# Patient Record
Sex: FEMALE | Race: AMERICAN INDIAN OR ALASKA NATIVE
[De-identification: names, ages, dates, MRNs, and addresses within clinical notes are randomized per-mention and may not be internally consistent; named-entity substitution may affect disease eponyms.]

---

## 2017-07-14 ENCOUNTER — HOSPITAL ENCOUNTER (INPATIENT)
Dept: HOSPITAL 14 - H.ER | Age: 52
LOS: 3 days | Discharge: HOME | DRG: 191 | End: 2017-07-17
Attending: INTERNAL MEDICINE | Admitting: INTERNAL MEDICINE
Payer: MEDICARE

## 2017-07-14 VITALS — BODY MASS INDEX: 33.3 KG/M2

## 2017-07-14 DIAGNOSIS — Z87.891: ICD-10-CM

## 2017-07-14 DIAGNOSIS — Z96.642: ICD-10-CM

## 2017-07-14 DIAGNOSIS — J44.1: Primary | ICD-10-CM

## 2017-07-14 DIAGNOSIS — D86.0: ICD-10-CM

## 2017-07-14 DIAGNOSIS — I27.2: ICD-10-CM

## 2017-07-14 DIAGNOSIS — J45.909: ICD-10-CM

## 2017-07-14 DIAGNOSIS — I25.10: ICD-10-CM

## 2017-07-14 DIAGNOSIS — J06.9: ICD-10-CM

## 2017-07-14 DIAGNOSIS — F41.9: ICD-10-CM

## 2017-07-14 DIAGNOSIS — R09.02: ICD-10-CM

## 2017-07-14 DIAGNOSIS — D86.85: ICD-10-CM

## 2017-07-14 DIAGNOSIS — F32.9: ICD-10-CM

## 2017-07-14 DIAGNOSIS — I47.2: ICD-10-CM

## 2017-07-14 DIAGNOSIS — Z95.810: ICD-10-CM

## 2017-07-14 DIAGNOSIS — I50.9: ICD-10-CM

## 2017-07-14 DIAGNOSIS — I11.0: ICD-10-CM

## 2017-07-14 LAB
ALBUMIN SERPL-MCNC: 4 G/DL (ref 3.5–5)
ALBUMIN/GLOB SERPL: 1.1 {RATIO} (ref 1–2.1)
ALT SERPL-CCNC: 26 U/L (ref 9–52)
APTT BLD: 32.3 SECONDS (ref 25.6–37.1)
AST SERPL-CCNC: 25 U/L (ref 14–36)
BACTERIA #/AREA URNS HPF: (no result) /[HPF]
BASOPHILS # BLD AUTO: 0.1 K/UL (ref 0–0.2)
BASOPHILS NFR BLD: 0.5 % (ref 0–2)
BILIRUB UR-MCNC: NEGATIVE MG/DL
BNP SERPL-MCNC: 748 PG/ML (ref 0–900)
BUN SERPL-MCNC: 12 MG/DL (ref 7–17)
CALCIUM SERPL-MCNC: 9.4 MG/DL (ref 8.4–10.2)
COLOR UR: (no result)
EOSINOPHIL # BLD AUTO: 0.1 K/UL (ref 0–0.7)
EOSINOPHIL NFR BLD: 0.5 % (ref 0–4)
ERYTHROCYTE [DISTWIDTH] IN BLOOD BY AUTOMATED COUNT: 16.5 % (ref 11.5–14.5)
GFR NON-AFRICAN AMERICAN: > 60
GLUCOSE UR STRIP-MCNC: (no result) MG/DL
HGB BLD-MCNC: 13.1 G/DL (ref 12–16)
INR PPP: 1 (ref 0.9–1.2)
LEUKOCYTE ESTERASE UR-ACNC: (no result) LEU/UL
LYMPHOCYTES # BLD AUTO: 4.7 K/UL (ref 1–4.3)
LYMPHOCYTES NFR BLD AUTO: 35.6 % (ref 20–40)
MCH RBC QN AUTO: 29 PG (ref 27–31)
MCHC RBC AUTO-ENTMCNC: 32.3 G/DL (ref 33–37)
MCV RBC AUTO: 89.6 FL (ref 81–99)
MONOCYTES # BLD: 1.3 K/UL (ref 0–0.8)
MONOCYTES NFR BLD: 9.6 % (ref 0–10)
NEUTROPHILS # BLD: 7 K/UL (ref 1.8–7)
NEUTROPHILS NFR BLD AUTO: 53.8 % (ref 50–75)
NRBC BLD AUTO-RTO: 0 % (ref 0–0)
PH UR STRIP: 7 [PH] (ref 5–8)
PLATELET # BLD: 183 K/UL (ref 130–400)
PMV BLD AUTO: 9.1 FL (ref 7.2–11.7)
PROT UR STRIP-MCNC: NEGATIVE MG/DL
PROTHROMBIN TIME: 10.8 SECONDS (ref 9.8–13.1)
RBC # BLD AUTO: 4.53 MIL/UL (ref 3.8–5.2)
RBC # UR STRIP: NEGATIVE /UL
SP GR UR STRIP: 1.01 (ref 1–1.03)
SQUAMOUS EPITHIAL: < 1 /HPF (ref 0–5)
URINE CLARITY: CLEAR
URINE NITRATE: NEGATIVE
UROBILINOGEN UR-MCNC: (no result) MG/DL (ref 0.2–1)
WBC # BLD AUTO: 13.1 K/UL (ref 4.8–10.8)

## 2017-07-14 NOTE — ED PDOC
HPI: SOB/CHF/COPD


Time Seen by Provider: 07/14/17 19:14


Chief Complaint (Nursing): Shortness Of Breath


Chief Complaint (Provider): Shortness Of Breath


History Per: Patient


History/Exam Limitations: no limitations


Onset/Duration Of Symptoms: Days (x 1 week)


Current Symptoms Are (Timing): Still Present


Additional Complaint(s): 





Darinel is a 53 y/o female with a past medical history of asthma, pulmonary 

sarcoid, pulmonary hypertension, and CHF, who presents to the emergency 

department with complaints of shortness of breath associated with chest 

tightness, and a productive cough and yellow phlegm ongoing x 1 week. Patient 

denies fever, chills, nausea, vomiting, and diarrhea. Patient reports seeing 

her PCP/Pulmonary specialist Dr. Hope who started her on Prednisone with no 

improvement in symptoms. Patient sent to ED by Dr. Hope. 





PMD: Dr. Feliciano Hope MD 





Past Medical History


Reviewed: Historical Data, Nursing Documentation, Vital Signs


Vital Signs: 


 Last Vital Signs











Temp  98.3 F   07/15/17 00:45


 


Pulse  90   07/15/17 00:45


 


Resp  18   07/15/17 00:45


 


BP  140/95 H  07/15/17 00:45


 


Pulse Ox  96   07/15/17 00:45














- Medical History


PMH: Anxiety, Asthma, CAD, Cardia Arrhythmia, CHF, COPD, Depression, HTN, 

Pneumonia


   Denies: Arthritis, Atrial Fibrillation, Diabetes, HIV, Hypercholesterolemia, 

Hypothyroidism, Mitral Valve Prolapse, Peripheral Edema, Chronic Kidney Disease

, Rheumatoid Arthritis, Seizures, TIA


Other PMH: Pulmonary hypertension, pulmonary sarcoid





- Surgical History


Surgical History: Back Surgery (lumbar)


   Denies: Pacemaker


Other surgeries: Left hip replacement due to traumatic injury, Automated 

Implantable Cardioverter-Defibrillator (AICD)





- Family History


Family History: States: Unknown Family Hx





- Social History


Current smoker - smoking cessation education provided: No


Alcohol: None


Drugs: Denies





- Immunization History


Hx Tetanus Toxoid Vaccination: No


Hx Influenza Vaccination: No


Hx Pneumococcal Vaccination: No





- Home Medications


Home Medications: 


 Ambulatory Orders











 Medication  Instructions  Recorded


 


Albuterol 0.083% [Albuterol 0.083% 2.5 mg NEB BID 07/14/17





Inhal Sol (2.5 mg/3 ml) UD]  


 


Albuterol Sulfate [Proair Hfa] 2 puff NEB PRN PRN 07/14/17


 


Carvedilol [Coreg] 12.5 mg PO BID 07/14/17


 


Cyclobenzaprine [Flexeril] 10 mg PO DAILY 07/14/17


 


Fluticasone/Salmeterol 100/50 2 puff NEB DAILY 07/14/17





[Advair Diskus 100/50]  


 


Furosemide [Lasix] 40 mg PO DAILY 07/14/17


 


Omeprazole [Omeprazole] 40 mg PO DAILY 07/14/17


 


Potassium Chloride [K-Dur 20 mEq 20 meq PO DAILY 07/14/17





ER Tab]  


 


Ramipril [Altace] 10 mg PO DAILY 07/14/17


 


oxyCODONE [oxyCODONE Immediate 10 mg PO PRN PRN 07/14/17





Release Tab]  














- Allergies


Allergies/Adverse Reactions: 


 Allergies











Allergy/AdvReac Type Severity Reaction Status Date / Time


 


No Known Allergies Allergy   Verified 05/17/16 22:32














Review of Systems


ROS Statement: Except As Marked, All Systems Reviewed And Found Negative


Constitutional: Negative for: Fever, Chills


Cardiovascular: Positive for: Chest Pain (chest tightness)


Respiratory: Positive for: Cough (Productive cough with yellow phlegm production

), Shortness of Breath


Gastrointestinal: Negative for: Nausea, Vomiting, Diarrhea





Physical Exam





- Reviewed


Nursing Documentation Reviewed: Yes


Vital Signs Reviewed: Yes





- Physical Exam


Appears: Positive for: Non-toxic, No Acute Distress


Head Exam: Positive for: ATRAUMATIC, NORMAL INSPECTION, NORMOCEPHALIC


Skin: Positive for: Normal Color, Warm, Dry


Eye Exam: Positive for: EOMI, Normal appearance, PERRL


ENT: Positive for: Normal ENT Inspection


Neck: Positive for: Normal, Painless ROM, Supple


Cardiovascular/Chest: Positive for: Regular Rate, Rhythm


Respiratory: Positive for: Rhonchi (Bilateral diffuse rhonchi), Other (

Decreased air entry)


Gastrointestinal/Abdominal: Positive for: Normal Exam, Soft.  Negative for: 

Tenderness


Back: Positive for: Normal Inspection.  Negative for: Vertebral Tenderness


Extremity: Positive for: Normal ROM.  Negative for: Pedal Edema, Calf Tenderness

, Other (JVD)


Neurologic/Psych: Positive for: Alert, Oriented.  Negative for: Motor/Sensory 

Deficits





- Laboratory Results


Result Diagrams: 


 07/14/17 20:29





 07/14/17 20:08





- ECG


O2 Sat by Pulse Oximetry: 98 (RA)


Pulse Ox Interpretation: Normal





- Other Rad


  ** Chest X-Ray


X-Ray: Interpreted by Me, Viewed By Me


X-Ray Interpretation: Cardiomegaly and bilateral congestion with AICD present 





Medical Decision Making


Medical Decision Making: 





Time: 19:30


Initial Impression: 53 y/o female with productive cough, dyspnea, and a past 

medical history of pulmonary hypertension, pulmonary sarcoid, CHF and asthma 

with failure of outpatient treatment


Initial Plan:


--Labs 


--CXR


--EKG


--Trial of Lasix (40 mg IV), methylprednisolone (125 mg IV), and Albuterol/

Ipratropium (3 mL INH)


--Peak Flow pre/post treatment 


--Reevaluation 





Time: 20:30


--Admit to observation for asthma and CHF exacerbation 





Time: 20:35


--Consulted with Dr. Hope who agreed to place patient under his service for 

asthma exacerbation and CHF








--------------------------------------------------------------------------------

-----------------


Scribe Attestation:


Documented by Fe Madrigal, acting as a scribe for Maximilian Valenzuela MD





Provider Scribe Attestation:


All medical record entries made by the Scribe were at my direction and 

personally dictated by me. I have reviewed the chart and agree that the record 

accurately reflects my personal performance of the history, physical exam, 

medical decision making, and the department course for this patient. I have 

also personally directed, reviewed, and agree with the discharge instructions 

and disposition.





Disposition





- Clinical Impression


Clinical Impression: 


 Chronic congestive heart failure, Sarcoidosis, Asthma








- Patient ED Disposition


Is Patient to be Admitted: Yes


Counseled Patient/Family Regarding: Studies Performed, Diagnosis





- Disposition


Disposition Time: 20:00


Condition: STABLE





- Pt Status Changed To:


Hospital Disposition Of: Observation

## 2017-07-15 LAB
ARTERIAL BLOOD GAS HEMOGLOBIN: 13.6 G/DL (ref 11.7–17.4)
ARTERIAL BLOOD GAS O2 SAT: 98 % (ref 95–98)
ARTERIAL BLOOD GAS PCO2: 51 MM/HG (ref 35–45)
ARTERIAL BLOOD GAS TCO2: 33.2 MMOL/L (ref 22–28)
ARTERIAL PATENCY WRIST A: YES
BNP SERPL-MCNC: 1020 PG/ML (ref 0–900)
BUN SERPL-MCNC: 14 MG/DL (ref 7–17)
CALCIUM SERPL-MCNC: 9.9 MG/DL (ref 8.4–10.2)
ERYTHROCYTE [DISTWIDTH] IN BLOOD BY AUTOMATED COUNT: 16.7 % (ref 11.5–14.5)
GFR NON-AFRICAN AMERICAN: > 60
HCO3 BLDA-SCNC: 29.1 MMOL/L (ref 21–28)
HGB BLD-MCNC: 13.2 G/DL (ref 12–16)
INHALED O2 CONCENTRATION: 28 %
MCH RBC QN AUTO: 29.2 PG (ref 27–31)
MCHC RBC AUTO-ENTMCNC: 32.9 G/DL (ref 33–37)
MCV RBC AUTO: 88.6 FL (ref 81–99)
O2 CAP BLDA-SCNC: 18.5 ML/DL (ref 16–24)
O2 CT BLDA-SCNC: 18.1 ML/DL (ref 15–23)
PH BLDA: 7.4 [PH] (ref 7.35–7.45)
PLATELET # BLD: 173 K/UL (ref 130–400)
PO2 BLDA: 78 MM/HG (ref 80–100)
RBC # BLD AUTO: 4.52 MIL/UL (ref 3.8–5.2)
WBC # BLD AUTO: 11.2 K/UL (ref 4.8–10.8)

## 2017-07-15 RX ADMIN — OXYCODONE HYDROCHLORIDE AND ACETAMINOPHEN PRN TAB: 5; 325 TABLET ORAL at 12:37

## 2017-07-15 RX ADMIN — IPRATROPIUM BROMIDE AND ALBUTEROL SULFATE SCH ML: .5; 3 SOLUTION RESPIRATORY (INHALATION) at 02:44

## 2017-07-15 RX ADMIN — IPRATROPIUM BROMIDE AND ALBUTEROL SULFATE SCH ML: .5; 3 SOLUTION RESPIRATORY (INHALATION) at 19:11

## 2017-07-15 RX ADMIN — FLUTICASONE PROPIONATE AND SALMETEROL SCH PUFF: 50; 100 POWDER RESPIRATORY (INHALATION) at 16:30

## 2017-07-15 RX ADMIN — OXYCODONE HYDROCHLORIDE AND ACETAMINOPHEN PRN TAB: 5; 325 TABLET ORAL at 21:55

## 2017-07-15 RX ADMIN — OXYCODONE HYDROCHLORIDE AND ACETAMINOPHEN PRN TAB: 5; 325 TABLET ORAL at 04:47

## 2017-07-15 RX ADMIN — METHYLPREDNISOLONE SODIUM SUCCINATE SCH MLS/HR: 40 INJECTION, POWDER, FOR SOLUTION INTRAMUSCULAR; INTRAVENOUS at 16:29

## 2017-07-15 RX ADMIN — PANTOPRAZOLE SODIUM SCH MG: 40 TABLET, DELAYED RELEASE ORAL at 08:37

## 2017-07-15 RX ADMIN — IPRATROPIUM BROMIDE AND ALBUTEROL SULFATE SCH ML: .5; 3 SOLUTION RESPIRATORY (INHALATION) at 13:03

## 2017-07-15 RX ADMIN — METHYLPREDNISOLONE SODIUM SUCCINATE SCH MLS/HR: 40 INJECTION, POWDER, FOR SOLUTION INTRAMUSCULAR; INTRAVENOUS at 08:39

## 2017-07-15 RX ADMIN — ENOXAPARIN SODIUM SCH MG: 40 INJECTION SUBCUTANEOUS at 08:37

## 2017-07-15 RX ADMIN — IPRATROPIUM BROMIDE AND ALBUTEROL SULFATE SCH ML: .5; 3 SOLUTION RESPIRATORY (INHALATION) at 07:12

## 2017-07-15 RX ADMIN — POTASSIUM CHLORIDE SCH MEQ: 20 TABLET, EXTENDED RELEASE ORAL at 08:37

## 2017-07-15 RX ADMIN — FLUTICASONE PROPIONATE AND SALMETEROL SCH PUFF: 50; 100 POWDER RESPIRATORY (INHALATION) at 08:35

## 2017-07-15 NOTE — RAD
HISTORY:

cough  



COMPARISON:

Chest x-ray performed 6/28/16 



TECHNIQUE:

Chest, one view.



FINDINGS:

Examination limited by habitus.



LUNGS:

Linear atelectasis, left upper lobe. No focal consolidation.



Please note that chest x-ray has limited sensitivity for the 

detection of pulmonary masses.



PLEURA:

No significant pleural effusion identified. No definite pneumothorax .



CARDIOVASCULAR:

Single lead left-sided AICD. Heart size appears top normal.



OSSEOUS STRUCTURES:

 Degenerative changes.



VISUALIZED UPPER ABDOMEN:

Unremarkable.



OTHER FINDINGS:

None.



IMPRESSION:

Left-sided AICD. 



Linear atelectasis, left upper lobe.

## 2017-07-15 NOTE — CARD
--------------- APPROVED REPORT --------------





EKG Measurement

Heart [id number]

MS [id number]

[id number]

[id number]

[id number]



<Conclusion>

Normal sinus rhythm

Left atrial enlargement

Nonspecific ST and T wave abnormality

Abnormal ECG

## 2017-07-15 NOTE — CARD
--------------- APPROVED REPORT --------------





EKG Measurement

Heart [id number]

NH [id number]

[id number]

[id number]

[id number]



<Conclusion>

Sinus rhythm with occasional premature ventricular complexes

Biatrial enlargement

Left ventricular hypertrophy

T wave abnormality, consider lateral ischemia

Abnormal ECG

## 2017-07-15 NOTE — CP.PCM.CON
History of Present Illness





- History of Present Illness


History of Present Illness: 





CC:


Cough.





HPI:


I have been requested on cardiology consultation by Dr. Hope for this patient 

who is known to our practice. She has a history of sarcoidosis and 

cardiomyopathy for which an AICD was implanted. She is is admitted for dyspnea 

and a productive cough with greenish sputum as well as palpitations. She denies 

chest pain, fever, chills or any other cardiac complaint. Cardiology is 

consulted for 8 beats of NSVT. Otherwise the patient has no complaints.





Review of Systems





- Constitutional


Constitutional: As Per HPI





- EENT


Eyes: As Per HPI


Nose/Mouth/Throat: As Per HPI





- Cardiovascular


Cardiovascular: Palpitations





- Respiratory


Respiratory: Cough, Excessive Mucous Production





- Genitourinary


Genitourinary: As Per HPI





- Integumentary


Integumentary: As Per HPI





- Neurological


Neurological: As Per HPI





- Psychiatric


Psychiatric: As Per HPI





- Endocrine


Endocrine: As Per HPI





- Hematologic/Lymphatic


Hematologic: As Per HPI





Past Patient History





- Infectious Disease


Hx of Infectious Diseases: None





- Past Medical History & Family History


Past Medical History?: Yes





- Past Social History


Alcohol: None


Drugs: Denies





- CARDIAC


Hx Atrial Fibrillation: No


Hx Cardia Arrhythmia: Yes


Hx Congestive Heart Failure: Yes


Hx Hypercholesterolemia: No


Hx Hypertension: Yes


Hx Mitral Valve Prolapse: No


Hx Pacemaker: No


Hx Peripheral Edema: No





- PULMONARY


Hx Asthma: Yes


Hx Chronic Obstructive Pulmonary Disease (COPD): Yes


Hx Pneumonia: Yes





- NEUROLOGICAL


Hx Seizures: No


Hx Transient Ischemic Attacks (TIA): No





- HEENT


Hx HEENT Problems: No





- RENAL


Hx Chronic Kidney Disease: No





- ENDOCRINE/METABOLIC


Hx Hypothyroidism: No





- HEMATOLOGICAL/ONCOLOGICAL


Hx Human Immunodeficiency Virus (HIV): No





- INTEGUMENTARY


Hx Dermatological Problems: No





- MUSCULOSKELETAL/RHEUMATOLOGICAL


Hx Arthritis: No


Hx Rheumatoid Arthritis: No





- GASTROINTESTINAL


Hx Gastrointestinal Disorders: No





- GENITOURINARY/GYNECOLOGICAL


Hx Genitourinary Disorders: No





- PSYCHIATRIC


Hx Anxiety: Yes


Hx Depression: Yes





- SURGICAL HISTORY


Hx Surgeries: Yes


Hx Joint Replacement: Yes (LEFT HIP)


Other/Comment: BACK SURGERY, AICD placement





- ANESTHESIA


Hx Anesthesia: Yes


Hx Anesthesia Reactions: No


Hx Malignant Hyperthermia: No





Meds


Allergies/Adverse Reactions: 


 Allergies











Allergy/AdvReac Type Severity Reaction Status Date / Time


 


No Known Allergies Allergy   Verified 05/17/16 22:32














- Medications


Medications: 


 Current Medications





Albuterol/Ipratropium (Duoneb 3 Mg/0.5 Mg (3 Ml) Ud)  3 ml INH RQ6 [facility]


   Last Admin: 07/15/17 19:11 Dose:  3 ml


Aspirin (Aspirin Chewable)  81 mg PO DAILY [facility]


   Last Admin: 07/15/17 08:37 Dose:  81 mg


Carvedilol (Coreg)  12.5 mg PO Q12 [facility]


   Last Admin: 07/15/17 21:55 Dose:  12.5 mg


Clotrimazole (Lotrimin 1% Cream)  1 applic TOP BID [facility]


   Last Admin: 07/15/17 16:29 Dose:  1 applic


Cyclobenzaprine HCl (Flexeril)  10 mg PO DAILY [facility]


   Last Admin: 07/15/17 08:36 Dose:  10 mg


Enoxaparin Sodium (Lovenox)  40 mg SC DAILY [facility]


   PRN Reason: Protocol


   Last Admin: 07/15/17 08:37 Dose:  40 mg


Furosemide (Lasix)  40 mg IV DAILY [facility]


   Last Admin: 07/15/17 08:38 Dose:  40 mg


Guaifenesin (Robitussin)  200 mg PO Q4 PRN


   PRN Reason: Cough


Azithromycin 500 mg/ Sodium (Chloride)  250 mls @ 250 mls/hr IVPB DAILY [facility]


   Last Admin: 07/15/17 08:39 Dose:  250 mls/hr


Methylprednisolone 40 mg/ (Sodium Chloride)  50 mls @ 100 mls/hr IV Q8 [facility]


   Last Admin: 07/15/17 16:29 Dose:  100 mls/hr


Ondansetron HCl (Zofran Inj)  4 mg IVP Q4 PRN


   PRN Reason: Nausea/Vomiting


   Last Admin: 07/15/17 17:44 Dose:  4 mg


Oxycodone/Acetaminophen (Percocet 5/325 Mg Tab)  1 tab PO Q4 PRN


   PRN Reason: Pain, moderate (4-7)


   Stop: 07/18/17 01:19


   Last Admin: 07/15/17 21:55 Dose:  1 tab


Pantoprazole Sodium (Protonix Ec Tab)  40 mg PO DAILY [facility]


   Last Admin: 07/15/17 08:37 Dose:  40 mg


Potassium Chloride (K-Dur 20 Meq Er Tab)  20 meq PO DAILY [facility]


   Last Admin: 07/15/17 08:37 Dose:  20 meq


Ramipril (Altace)  10 mg PO DAILY [facility]


   Last Admin: 07/15/17 08:36 Dose:  10 mg


Fluticasone/Salmeterol (Advair Diskus 100/50)  1 puff INH BID [facility]


   Last Admin: 07/15/17 16:30 Dose:  1 puff











Physical Exam





- Constitutional


Appears: Well, Non-toxic, No Acute Distress





- Head Exam


Head Exam: NORMAL INSPECTION, NORMOCEPHALIC





- Eye Exam


Eye Exam: Normal appearance, PERRL





- ENT Exam


ENT Exam: Mucous Membranes Moist





- Neck Exam


Neck exam: Positive for: Normal Inspection





- Respiratory Exam


Respiratory Exam: Clear to Auscultation Bilateral, NORMAL BREATHING PATTERN





- Cardiovascular Exam


Cardiovascular Exam: REGULAR RHYTHM, +S1, +S2, Systolic Murmur





- GI/Abdominal Exam


GI & Abdominal Exam: Soft





- Rectal Exam


Rectal Exam: Deferred





- Extremities Exam


Extremities exam: Positive for: full ROM, normal inspection





- Back Exam


Back exam: NORMAL INSPECTION





- Neurological Exam


Neurological exam: Alert, Oriented x3





- Psychiatric Exam


Psychiatric exam: Normal Affect, Normal Mood





- Skin


Skin Exam: Dry, Normal Color, Warm





Results





- Vital Signs


Recent Vital Signs: 


 Last Vital Signs











Temp  97.7 F   07/15/17 19:44


 


Pulse  68   07/15/17 21:55


 


Resp  18   07/15/17 19:44


 


BP  122/75   07/15/17 21:55


 


Pulse Ox  97   07/15/17 19:44














- Labs


Result Diagrams: 


 07/15/17 09:00





 07/15/17 09:00





Assessment & Plan





- Assessment and Plan (Free Text)


Assessment: 





Cough


Dyspnea


Sarcoid


CMP


NSVT


Plan: 





Continue present medical management.


Beta blockers.


Will follow up PRN.


Thank you.





- Date & Time


Date: 07/15/17


Time: 12:05

## 2017-07-16 RX ADMIN — METHYLPREDNISOLONE SODIUM SUCCINATE SCH MLS/HR: 40 INJECTION, POWDER, FOR SOLUTION INTRAMUSCULAR; INTRAVENOUS at 20:23

## 2017-07-16 RX ADMIN — OXYCODONE HYDROCHLORIDE AND ACETAMINOPHEN PRN TAB: 5; 325 TABLET ORAL at 23:08

## 2017-07-16 RX ADMIN — FLUTICASONE PROPIONATE AND SALMETEROL SCH PUFF: 50; 100 POWDER RESPIRATORY (INHALATION) at 16:32

## 2017-07-16 RX ADMIN — FLUTICASONE PROPIONATE AND SALMETEROL SCH PUFF: 50; 100 POWDER RESPIRATORY (INHALATION) at 08:36

## 2017-07-16 RX ADMIN — ENOXAPARIN SODIUM SCH MG: 40 INJECTION SUBCUTANEOUS at 08:43

## 2017-07-16 RX ADMIN — POTASSIUM CHLORIDE SCH MEQ: 20 TABLET, EXTENDED RELEASE ORAL at 08:36

## 2017-07-16 RX ADMIN — METHYLPREDNISOLONE SODIUM SUCCINATE SCH MLS/HR: 40 INJECTION, POWDER, FOR SOLUTION INTRAMUSCULAR; INTRAVENOUS at 08:40

## 2017-07-16 RX ADMIN — METHYLPREDNISOLONE SODIUM SUCCINATE SCH MLS/HR: 40 INJECTION, POWDER, FOR SOLUTION INTRAMUSCULAR; INTRAVENOUS at 00:38

## 2017-07-16 RX ADMIN — OXYCODONE HYDROCHLORIDE AND ACETAMINOPHEN PRN TAB: 5; 325 TABLET ORAL at 17:37

## 2017-07-16 RX ADMIN — OXYCODONE HYDROCHLORIDE AND ACETAMINOPHEN PRN TAB: 5; 325 TABLET ORAL at 08:40

## 2017-07-16 RX ADMIN — IPRATROPIUM BROMIDE AND ALBUTEROL SULFATE SCH ML: .5; 3 SOLUTION RESPIRATORY (INHALATION) at 07:49

## 2017-07-16 RX ADMIN — IPRATROPIUM BROMIDE AND ALBUTEROL SULFATE SCH ML: .5; 3 SOLUTION RESPIRATORY (INHALATION) at 19:25

## 2017-07-16 RX ADMIN — PANTOPRAZOLE SODIUM SCH MG: 40 TABLET, DELAYED RELEASE ORAL at 08:36

## 2017-07-16 RX ADMIN — IPRATROPIUM BROMIDE AND ALBUTEROL SULFATE SCH ML: .5; 3 SOLUTION RESPIRATORY (INHALATION) at 01:42

## 2017-07-16 RX ADMIN — IPRATROPIUM BROMIDE AND ALBUTEROL SULFATE SCH ML: .5; 3 SOLUTION RESPIRATORY (INHALATION) at 13:34

## 2017-07-16 NOTE — CP.PCM.PN
Subjective





- Date & Time of Evaluation


Date of Evaluation: 07/16/17


Time of Evaluation: 09:36





- Subjective


Subjective: 





SOB IMPROVING


COUGH LESS


DENIES CHEST PAINS





Objective





- Vital Signs/Intake and Output


Vital Signs (last 24 hours): 


 











Temp Pulse Resp BP Pulse Ox


 


 97.6 F   72   18   121/70   98 


 


 07/16/17 08:00  07/16/17 08:39  07/16/17 08:00  07/16/17 08:39  07/16/17 08:00











- Medications


Medications: 


 Current Medications





Albuterol/Ipratropium (Duoneb 3 Mg/0.5 Mg (3 Ml) Ud)  3 ml INH RQ6 [facility]


   Last Admin: 07/16/17 07:49 Dose:  3 ml


Aspirin (Aspirin Chewable)  81 mg PO DAILY [facility]


   Last Admin: 07/16/17 08:36 Dose:  81 mg


Carvedilol (Coreg)  12.5 mg PO Q12 [facility]


   Last Admin: 07/16/17 08:39 Dose:  12.5 mg


Clotrimazole (Lotrimin 1% Cream)  1 applic TOP BID [facility]


   Last Admin: 07/15/17 16:29 Dose:  1 applic


Cyclobenzaprine HCl (Flexeril)  10 mg PO DAILY [facility]


   Last Admin: 07/16/17 08:36 Dose:  10 mg


Enoxaparin Sodium (Lovenox)  40 mg SC DAILY [facility]


   PRN Reason: Protocol


   Last Admin: 07/16/17 08:43 Dose:  40 mg


Furosemide (Lasix)  40 mg IV DAILY [facility]


   Last Admin: 07/16/17 08:39 Dose:  40 mg


Guaifenesin (Robitussin)  200 mg PO Q4 PRN


   PRN Reason: Cough


Azithromycin 500 mg/ Sodium (Chloride)  250 mls @ 250 mls/hr IVPB DAILY [facility]


   Last Admin: 07/16/17 08:39 Dose:  250 mls/hr


Methylprednisolone 40 mg/ (Sodium Chloride)  50 mls @ 100 mls/hr IV Q8 [facility]


   Last Admin: 07/16/17 08:40 Dose:  100 mls/hr


Ondansetron HCl (Zofran Inj)  4 mg IVP Q4 PRN


   PRN Reason: Nausea/Vomiting


   Last Admin: 07/15/17 17:44 Dose:  4 mg


Oxycodone/Acetaminophen (Percocet 5/325 Mg Tab)  1 tab PO Q4 PRN


   PRN Reason: Pain, moderate (4-7)


   Stop: 07/18/17 01:19


   Last Admin: 07/16/17 08:40 Dose:  1 tab


Pantoprazole Sodium (Protonix Ec Tab)  40 mg PO DAILY [facility]


   Last Admin: 07/16/17 08:36 Dose:  40 mg


Potassium Chloride (K-Dur 20 Meq Er Tab)  20 meq PO DAILY [facility]


   Last Admin: 07/16/17 08:36 Dose:  20 meq


Ramipril (Altace)  10 mg PO DAILY [facility]


   Last Admin: 07/16/17 08:37 Dose:  10 mg


Fluticasone/Salmeterol (Advair Diskus 100/50)  1 puff INH BID [facility]


   Last Admin: 07/16/17 08:36 Dose:  1 puff











- Labs


Labs: 


 











PT  10.8 Seconds (9.8-13.1)   07/14/17  20:29    


 


INR  1.0  (0.9-1.2)   07/14/17  20:29    


 


APTT  32.3 Seconds (25.6-37.1)   07/14/17  20:29    














- Constitutional


Appears: No Acute Distress





- Head Exam


Head Exam: ATRAUMATIC, NORMAL INSPECTION, NORMOCEPHALIC





- Eye Exam


Eye Exam: EOMI, Normal appearance, PERRL


Pupil Exam: NORMAL ACCOMODATION, PERRL





- ENT Exam


ENT Exam: Mucous Membranes Moist, Normal Exam





- Neck Exam


Neck Exam: Full ROM, Normal Inspection.  absent: Lymphadenopathy





- Respiratory Exam


Respiratory Exam: Decreased Breath Sounds, Rales, Wheezes, NORMAL BREATHING 

PATTERN





- Cardiovascular Exam


Cardiovascular Exam: REGULAR RHYTHM, +S1, +S2.  absent: Murmur





- GI/Abdominal Exam


GI & Abdominal Exam: Soft, Normal Bowel Sounds.  absent: Tenderness





- Rectal Exam


Rectal Exam: NORMAL INSPECTION





- Extremities Exam


Extremities Exam: Full ROM, Normal Capillary Refill, Normal Inspection.  absent

: Joint Swelling, Pedal Edema





- Back Exam


Back Exam: NORMAL INSPECTION





- Neurological Exam


Neurological Exam: Alert, Awake, CN II-XII Intact, Normal Gait, Oriented x3





- Psychiatric Exam


Psychiatric exam: Normal Affect, Normal Mood





- Skin


Skin Exam: Dry, Intact, Normal Color, Warm





Assessment and Plan





- Assessment and Plan (Free Text)


Assessment: 





ACUTE EXAC OF COPD--IMPROVING


SARCOID


ARRYTHMIAS-STABLE


CARDIOMYOPATHY


Plan: 





CONTINUE PRESENT RX


SCHEDULE FOR ECHO

## 2017-07-16 NOTE — HP
HISTORY OF PRESENT ILLNESS:  Ms. Franz is a 52-year-old female who

was admitted at emergency room because of shortness of breath, exercise

intolerance cough, mild fever, exercise intolerance for the past several

days prior to presentation.  She was in the office about a week prior to

this admission, placed on antibiotics, steroids, and *------*, but symptoms

have worsened.  She showed up in the emergency room, had hypoxemia, we will

therefore advise admission for workup and therapy.



PAST MEDICAL HISTORY:  She has a past medical history of pulmonary

sarcoidosis; asthma, which is probably chronic obstructive pulmonary

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

status post pacemaker placement for arrhythmias; hypertension; poor

compliance with followup and therapy.



FAMILY HISTORY:  Unrevealing.



SOCIAL HISTORY:  She quit smoking years ago.  Does not drink alcohol and

leaves alone.



REVIEW OF SYSTEMS:  Essentially remarkable for occasional shortness of

breath and back pain for which she had undergone surgery.



PHYSICAL EXAMINATION

GENERAL:  The patient is alert, oriented; appears mildly dyspneic at rest

and had mild exertion.

VITAL SIGNS:  Blood pressure 134/74, pulse of 67, respiratory rate 18 to 20

per minute.  She is afebrile, O2 stat 99% on nasal cannula oxygen.

SKIN:  Shows fair turgor with rash of thighs and legs.

HEENT:  Pupils equal, reactive to light and accommodation.  She has fair

hygiene.

NECK:  JVP flat.

LUNGS:  Poor aeration with wheezing and rales.

HEART:  Regular with *------* pacemaker in place at the left anterior chest

wall.

ABDOMEN:  Soft, nontender, organomegaly.

EXTREMITIES:  Shows no edema or cyanosis.

CENTRAL NERVOUS SYSTEM:  Grossly intact.



LABORATORY DATA:  Remarkable of WBC of 13.1, hemoglobin 13.1, platelet

count of 183,000.  Sodium 139, potassium 3.5, BUN 12, creatinine 0.8. 

ProBNP 748, repeat 1020.  ABGs *------* 28% FiO2, PH 7.4, pCO2 of 61, pO2

of 78, bicarbonates 29 with O2 sat 94%.



Chest x-ray, linear atelectasis of left upper lobe, no consolidation.  EKG;

sinus rhythm with occasional PVCs, biatrial enlargement, left ventricular

hypertrophy, T-wave abnormality, consider lateral ischemia, telemetry

monitoring was remarkable for short run of ventricular tachycardia.



IMPRESSION:  Acute exacerbation of chronic obstructive pulmonary disease,

cardiomyopathy with cardiac arrhythmias, pulmonary sarcoidosis, upper

respiratory tract infection.



PLAN:  Pan cultures, IV antibiotics, IV steroids, aerosolized

bronchodilators.  Pancreatic evaluation, further therapy will depend on

findings.



__________________________________________

Feliciano Hope MD





DD:  07/15/2017 11:12:05

DT:  07/15/2017 14:37:40

Job # [id number]

## 2017-07-17 VITALS
TEMPERATURE: 97.9 F | DIASTOLIC BLOOD PRESSURE: 76 MMHG | SYSTOLIC BLOOD PRESSURE: 124 MMHG | HEART RATE: 69 BPM | RESPIRATION RATE: 16 BRPM

## 2017-07-17 VITALS — OXYGEN SATURATION: 99 %

## 2017-07-17 RX ADMIN — IPRATROPIUM BROMIDE AND ALBUTEROL SULFATE SCH ML: .5; 3 SOLUTION RESPIRATORY (INHALATION) at 07:51

## 2017-07-17 RX ADMIN — METHYLPREDNISOLONE SODIUM SUCCINATE SCH MLS/HR: 40 INJECTION, POWDER, FOR SOLUTION INTRAMUSCULAR; INTRAVENOUS at 09:24

## 2017-07-17 RX ADMIN — POTASSIUM CHLORIDE SCH MEQ: 20 TABLET, EXTENDED RELEASE ORAL at 09:23

## 2017-07-17 RX ADMIN — PANTOPRAZOLE SODIUM SCH MG: 40 TABLET, DELAYED RELEASE ORAL at 09:24

## 2017-07-17 RX ADMIN — ENOXAPARIN SODIUM SCH MG: 40 INJECTION SUBCUTANEOUS at 09:21

## 2017-07-17 RX ADMIN — IPRATROPIUM BROMIDE AND ALBUTEROL SULFATE SCH: .5; 3 SOLUTION RESPIRATORY (INHALATION) at 01:01

## 2017-07-17 RX ADMIN — FLUTICASONE PROPIONATE AND SALMETEROL SCH PUFF: 50; 100 POWDER RESPIRATORY (INHALATION) at 09:21

## 2017-07-17 NOTE — CP.PCM.DIS
Provider





- Provider


Date of Admission: 


07/15/17 11:02





Attending physician: 


Feliciano Cano MD





Time Spent in preparation of Discharge (in minutes): 30





Diagnosis





- Discharge Diagnosis


(1) Arrhythmia


Status: Acute   





(2) Upper respiratory infection


Status: Acute   





(3) Sarcoidosis


Status: Acute   





(4) COPD exacerbation


Status: Acute   





(5) Cardiomyopathy


Status: Acute   





(6) Hypertension


Status: Acute   





Hospital Course





- Lab Results


Lab Results: 


 Most Recent Lab Values











WBC  11.2 K/uL (4.8-10.8)  H  07/15/17  09:00    


 


RBC  4.52 Mil/uL (3.80-5.20)   07/15/17  09:00    


 


Hgb  13.2 g/dL (12.0-16.0)   07/15/17  09:00    


 


Hct  40.1 % (34.0-47.0)   07/15/17  09:00    


 


MCV  88.6 fl (81.0-99.0)   07/15/17  09:00    


 


MCH  29.2 pg (27.0-31.0)   07/15/17  09:00    


 


MCHC  32.9 g/dL (33.0-37.0)  L  07/15/17  09:00    


 


RDW  16.7 % (11.5-14.5)  H  07/15/17  09:00    


 


Plt Count  173 K/uL (130-400)   07/15/17  09:00    


 


MPV  9.1 fl (7.2-11.7)   07/14/17  20:29    


 


Neut % (Auto)  53.8 % (50.0-75.0)   07/14/17  20:29    


 


Lymph % (Auto)  35.6 % (20.0-40.0)   07/14/17  20:29    


 


Mono % (Auto)  9.6 % (0.0-10.0)   07/14/17  20:29    


 


Eos % (Auto)  0.5 % (0.0-4.0)   07/14/17  20:29    


 


Baso % (Auto)  0.5 % (0.0-2.0)   07/14/17  20:29    


 


Neut #  7.0 K/uL (1.8-7.0)   07/14/17  20:29    


 


Lymph #  4.7 K/uL (1.0-4.3)  H  07/14/17  20:29    


 


Mono #  1.3 K/uL (0.0-0.8)  H  07/14/17  20:29    


 


Eos #  0.1 K/uL (0.0-0.7)   07/14/17  20:29    


 


Baso #  0.1 K/uL (0.0-0.2)   07/14/17  20:29    


 


PT  10.8 Seconds (9.8-13.1)   07/14/17  20:29    


 


INR  1.0  (0.9-1.2)   07/14/17  20:29    


 


APTT  32.3 Seconds (25.6-37.1)   07/14/17  20:29    


 


pCO2  51 mm/Hg (35-45)  H  07/15/17  07:37    


 


pO2  78 mm/Hg ()  L  07/15/17  07:37    


 


HCO3  29.1 mmol/L (21-28)  H  07/15/17  07:37    


 


ABG pH  7.40  (7.35-7.45)   07/15/17  07:37    


 


ABG Total CO2  33.2 mmol/L (22-28)  H  07/15/17  07:37    


 


ABG O2 Saturation  98.0 % (95-98)   07/15/17  07:37    


 


ABG O2 Content  18.1 ML/dL (15-23)   07/15/17  07:37    


 


ABG Base Excess  5.5 mmol/L (-2.0-3.0)  H  07/15/17  07:37    


 


ABG Hemoglobin  13.6 g/dL (11.7-17.4)   07/15/17  07:37    


 


ABG Carboxyhemoglobin  1.9 % (0.5-1.5)  H  07/15/17  07:37    


 


POC ABG HHb (Measured)  1.9 % (0.0-5.0)   07/15/17  07:37    


 


ABG Methemoglobin  1.9 % (0.0-3.0)   07/15/17  07:37    


 


ABG O2 Capacity  18.5 mL/dL (16-24)   07/15/17  07:37    


 


Alfredo Test  Yes   07/15/17  07:37    


 


A-a O2 Difference  58.0 mm/Hg  07/15/17  07:37    


 


Hgb O2 Saturation  94.4 % (95.0-98.0)  L  07/15/17  07:37    


 


FiO2  28.0 %  07/15/17  07:37    


 


Sodium  141 mmol/l (132-148)   07/15/17  09:00    


 


Potassium  4.2 MMOL/L (3.6-5.0)   07/15/17  09:00    


 


Chloride  101 mmol/L ()   07/15/17  09:00    


 


Carbon Dioxide  29 mmol/L (22-30)   07/15/17  09:00    


 


Anion Gap  14  (10-20)   07/15/17  09:00    


 


BUN  14 mg/dl (7-17)   07/15/17  09:00    


 


Creatinine  0.9 mg/dL (0.7-1.2)   07/15/17  09:00    


 


Est GFR ( Amer)  > 60   07/15/17  09:00    


 


Est GFR (Non-Af Amer)  > 60   07/15/17  09:00    


 


Random Glucose  121 mg/dL ()  H  07/15/17  09:00    


 


Calcium  9.9 mg/dL (8.4-10.2)   07/15/17  09:00    


 


Total Bilirubin  0.5 mg/dl (0.2-1.3)   07/14/17  20:08    


 


AST  25 U/L (14-36)   07/14/17  20:08    


 


ALT  26 U/L (9-52)   07/14/17  20:08    


 


Alkaline Phosphatase  138 U/L ()  H D 07/14/17  20:08    


 


Troponin I  0.0150 ng/mL (0.00-0.120)   07/14/17  20:08    


 


NT-Pro-B Natriuret Pep  1020 pg/ml (0-900)  H  07/15/17  09:00    


 


Total Protein  7.7 G/DL (6.3-8.2)   07/14/17  20:08    


 


Albumin  4.0 g/dL (3.5-5.0)   07/14/17  20:08    


 


Globulin  3.6 gm/dL (2.2-3.9)   07/14/17  20:08    


 


Albumin/Globulin Ratio  1.1  (1.0-2.1)   07/14/17  20:08    


 


Urine Color  Straw  (YELLOW)   07/14/17  20:59    


 


Urine Clarity  Clear  (Clear)   07/14/17  20:59    


 


Urine pH  7.0  (5.0-8.0)   07/14/17  20:59    


 


Ur Specific Gravity  1.008  (1.003-1.030)   07/14/17  20:59    


 


Urine Protein  Negative mg/dL (NEGATIVE)   07/14/17  20:59    


 


Urine Glucose (UA)  Neg mg/dL (Normal)   07/14/17  20:59    


 


Urine Ketones  Negative mg/dL (NEGATIVE)   07/14/17  20:59    


 


Urine Blood  Negative  (NEGATIVE)   07/14/17  20:59    


 


Urine Nitrate  Negative  (NEGATIVE)   07/14/17  20:59    


 


Urine Bilirubin  Negative  (NEGATIVE)   07/14/17  20:59    


 


Urine Urobilinogen  0.2-1.0 mg/dL (0.2-1.0)   07/14/17  20:59    


 


Ur Leukocyte Esterase  Trace Cat/uL (Negative)   07/14/17  20:59    


 


Urine RBC (Auto)  1 /hpf (0-3)   07/14/17  20:59    


 


Urine Microscopic WBC  1 /hpf (0-5)   07/14/17  20:59    


 


Ur Squamous Epith Cells  < 1 /hpf (0-5)   07/14/17  20:59    


 


Urine Bacteria  Rare  (<OCC)   07/14/17  20:59    














- Hospital Course


Hospital Course: 





COUGH WITH SOB RESOLVED





Discharge Exam





- Head Exam


Head Exam: ATRAUMATIC, NORMAL INSPECTION, NORMOCEPHALIC





- Eye Exam


Eye Exam: EOMI, Normal appearance, PERRL


Pupil Exam: NORMAL ACCOMODATION, PERRL





- GI/Abdominal Exam


GI & Abdominal Exam: Normal Bowel Sounds





- Rectal Exam


Rectal Exam: NORMAL INSPECTION





- Neurological Exam


Neurological exam: Alert, CN II-XII Intact, Normal Gait, Oriented x3, Reflexes 

Normal





- Psychiatric Exam


Psychiatric exam: Normal Affect, Normal Mood





- Skin


Skin Exam: Dry, Intact, Normal Color, Warm





Discharge Plan





- Follow Up Plan


Condition: STABLE


Disposition: HOME/ ROUTINE


Patient education suggested?: Yes


Additional Instructions: 


D/C HOME FOLLOW UP WITH DR CANO

## 2017-07-17 NOTE — CARD
--------------- APPROVED REPORT --------------





EXAM: Two-dimensional and M-mode echocardiogram with Doppler and 

color Doppler.



Other Information 

Quality : GoodRhythm : Pacemaker



INDICATION

Cardiomyopathy 



Surgery/Intervention

ICD/Pacemaker: 



2D DIMENSIONS 

IVSd1.07   (0.7-1.1cm)LVDd5.16   (3.9-5.9cm)

LVOT Diameter2.15   (1.8-2.4cm)PWd0.87   (0.7-1.1cm)

IVSs1.08   (0.8-1.2cm)LVDs3.93   (2.5-4.0cm)

FS (%) 23.9   %PWs1.15   (0.8-1.2cm)

LVEF (%)50.0   (>50%)



M-Mode DIMENSIONS 

Left Atrium (MM)4.36   (2.5-4.0cm)IVSd1.02   (0.7-1.1cm)

Aortic Root2.59   (2.2-3.7cm)LVDd4.88   (4.0-5.6cm)

Aortic Cusp Exc.1.60   (1.5-2.0cm)PWd1.10   (0.7-1.1cm)

IVSs1.38   cmFS (%) 37   %

LVDs3.09   (2.0-3.8cm)PWs1.54   cm



Mitral Valve

MV E Wavfgezr08.1cm/sMV DECEL [id number] A Zltrohnr40.4cm/s

MV MAX56chZ/A ratio0.8MVA (PHT)2.56cm2



TDI

E/Lateral E'0.0E/Medial E'0.0



Pulmonary Valve

PV Peak Sdgbynpk079.5cm/s



 LEFT VENTRICLE 

The left ventricle is normal size.

There is borderline concentric left ventricular hypertrophy.

The left ventricular function is Borderline

Regional wall motion abnormalities noted.

Transmitral Doppler flow pattern is Grade I-abnormal relaxation 

pattern.



 RIGHT VENTRICLE 

The right ventricle is normal size.

There is normal right ventricular wall thickness.

The right ventricular systolic function is normal.

There is a pacemaker lead in the right ventricle.



 ATRIA 

The left atrium is mildly dilated.

The right atrium size is normal.



 AORTIC VALVE 

The aortic valve is not well visualized.

No aortic regurgitation is present.

There is no aortic valvular stenosis.



 MITRAL VALVE 

The mitral valve is mildly thickened.

There is no mitral valve stenosis.

Mitral regurgitation is mild.

The mitral regurgitant jet is eccentrically directed.



 TRICUSPID VALVE 

The tricuspid valve is normal in structure

There is trace tricuspid regurgitation.



 PULMONIC VALVE 

The pulmonary valve is normal in structure and function.

There is no pulmonic valvular regurgitation.



 GREAT VESSELS 

The aortic root is normal in size.

The IVC was not visualized.



 PERICARDIAL EFFUSION 

There is a trace loculated anterior pericardial effusion.



<Conclusion>

The left ventricle is normal size.

There is borderline concentric left ventricular hypertrophy.

The left ventricular function is normal.

The left ventricular ejection fraction is borderline

Regional wall motion abnormalities noted.

Transmitral Doppler flow pattern is Grade I-abnormal relaxation 

pattern.

Mitral regurgitation is mild.

## 2018-06-02 ENCOUNTER — HOSPITAL ENCOUNTER (EMERGENCY)
Dept: HOSPITAL 14 - H.ER | Age: 53
Discharge: HOME | End: 2018-06-02
Payer: MEDICARE

## 2018-06-02 VITALS
RESPIRATION RATE: 16 BRPM | TEMPERATURE: 98.2 F | SYSTOLIC BLOOD PRESSURE: 147 MMHG | HEART RATE: 104 BPM | DIASTOLIC BLOOD PRESSURE: 93 MMHG

## 2018-06-02 VITALS — BODY MASS INDEX: 35 KG/M2

## 2018-06-02 VITALS — OXYGEN SATURATION: 97 %

## 2018-06-02 DIAGNOSIS — Z86.59: ICD-10-CM

## 2018-06-02 DIAGNOSIS — J44.0: Primary | ICD-10-CM

## 2018-06-02 DIAGNOSIS — Z87.891: ICD-10-CM

## 2018-06-02 LAB
ALBUMIN SERPL-MCNC: 4.1 G/DL (ref 3.5–5)
ALBUMIN/GLOB SERPL: 1.1 {RATIO} (ref 1–2.1)
ALT SERPL-CCNC: 26 U/L (ref 9–52)
APTT BLD: 32.6 SECONDS (ref 25.6–37.1)
AST SERPL-CCNC: 30 U/L (ref 14–36)
BASOPHILS # BLD AUTO: 0.1 K/UL (ref 0–0.2)
BASOPHILS NFR BLD: 0.6 % (ref 0–2)
BNP SERPL-MCNC: 348 PG/ML (ref 0–900)
BUN SERPL-MCNC: 14 MG/DL (ref 7–17)
CALCIUM SERPL-MCNC: 9.3 MG/DL (ref 8.4–10.2)
EOSINOPHIL # BLD AUTO: 0.2 K/UL (ref 0–0.7)
EOSINOPHIL NFR BLD: 1.7 % (ref 0–4)
ERYTHROCYTE [DISTWIDTH] IN BLOOD BY AUTOMATED COUNT: 14.9 % (ref 11.5–14.5)
GFR NON-AFRICAN AMERICAN: > 60
HGB BLD-MCNC: 13.2 G/DL (ref 12–16)
INR PPP: 1.2 (ref 0.9–1.2)
LYMPHOCYTES # BLD AUTO: 2.2 K/UL (ref 1–4.3)
LYMPHOCYTES NFR BLD AUTO: 20.7 % (ref 20–40)
MCH RBC QN AUTO: 30 PG (ref 27–31)
MCHC RBC AUTO-ENTMCNC: 33 G/DL (ref 33–37)
MCV RBC AUTO: 91 FL (ref 81–99)
MONOCYTES # BLD: 1.2 K/UL (ref 0–0.8)
MONOCYTES NFR BLD: 11.1 % (ref 0–10)
NEUTROPHILS # BLD: 6.9 K/UL (ref 1.8–7)
NEUTROPHILS NFR BLD AUTO: 65.9 % (ref 50–75)
NRBC BLD AUTO-RTO: 0 % (ref 0–0)
PLATELET # BLD: 189 K/UL (ref 130–400)
PMV BLD AUTO: 9.2 FL (ref 7.2–11.7)
PROTHROMBIN TIME: 13.3 SECONDS (ref 9.8–13.1)
RBC # BLD AUTO: 4.4 MIL/UL (ref 3.8–5.2)
WBC # BLD AUTO: 10.5 K/UL (ref 4.8–10.8)

## 2018-06-02 PROCEDURE — 87040 BLOOD CULTURE FOR BACTERIA: CPT

## 2018-06-02 PROCEDURE — 85730 THROMBOPLASTIN TIME PARTIAL: CPT

## 2018-06-02 PROCEDURE — 85025 COMPLETE CBC W/AUTO DIFF WBC: CPT

## 2018-06-02 PROCEDURE — 87804 INFLUENZA ASSAY W/OPTIC: CPT

## 2018-06-02 PROCEDURE — 83880 ASSAY OF NATRIURETIC PEPTIDE: CPT

## 2018-06-02 PROCEDURE — 96374 THER/PROPH/DIAG INJ IV PUSH: CPT

## 2018-06-02 PROCEDURE — 80053 COMPREHEN METABOLIC PANEL: CPT

## 2018-06-02 PROCEDURE — 71046 X-RAY EXAM CHEST 2 VIEWS: CPT

## 2018-06-02 PROCEDURE — 84100 ASSAY OF PHOSPHORUS: CPT

## 2018-06-02 PROCEDURE — 85610 PROTHROMBIN TIME: CPT

## 2018-06-02 PROCEDURE — 99285 EMERGENCY DEPT VISIT HI MDM: CPT

## 2018-06-02 PROCEDURE — 83735 ASSAY OF MAGNESIUM: CPT

## 2018-06-02 PROCEDURE — 84484 ASSAY OF TROPONIN QUANT: CPT

## 2018-06-02 NOTE — ED PDOC
HPI: SOB/CHF/COPD


Time Seen by Provider: 06/02/18 19:05


Chief Complaint (Nursing): Shortness Of Breath


Chief Complaint (Provider): shortness of breath 


History Per: Patient


History/Exam Limitations: no limitations


Onset/Duration Of Symptoms: Other (2 weeks)


Current Respiratory Medications: Albuterol


Associated Symptoms: denies: Fever, Productive Cough


Additional Complaint(s): 


52 year old female presents to the ED complaining of shortness of breath and 

chest tightness onset for 2 days. Reports of cough with green sputum and sore 

throat. States she had not relief with Albuterol. Denies leg swelling, fever, 

nasal drainage, recent sick contacts or travel. Patient is well known to the 

ED. 





PMD: Feliciano Hope








Past Medical History


Reviewed: Historical Data, Nursing Documentation, Vital Signs


Vital Signs: 


 Last Vital Signs











Temp  98.2 F   06/02/18 21:27


 


Pulse  104 H  06/02/18 21:27


 


Resp  16   06/02/18 21:27


 


BP  147/93 H  06/02/18 21:27


 


Pulse Ox  97   06/02/18 21:44














- Medical History


PMH: Anxiety, Asthma, CAD, Cardia Arrhythmia, CHF, COPD, Depression, HTN, 

Pneumonia


   Denies: Arthritis, Atrial Fibrillation, Diabetes, HIV, Hypercholesterolemia, 

Hypothyroidism, Mitral Valve Prolapse, Peripheral Edema, Chronic Kidney Disease

, Rheumatoid Arthritis, Seizures, TIA





- Surgical History


Surgical History: Back Surgery (lumbar)


   Denies: Pacemaker





- Family History


Family History: States: Unknown Family Hx





- Social History


Current smoker - smoking cessation education provided: No (Former Smoker)


Alcohol: None


Drugs: Denies





- Immunization History


Hx Tetanus Toxoid Vaccination: No


Hx Influenza Vaccination: No


Hx Pneumococcal Vaccination: No





- Home Medications


Home Medications: 


 Ambulatory Orders











 Medication  Instructions  Recorded


 


Albuterol 0.083% [Albuterol 0.083% 2.5 mg NEB BID 07/14/17





Inhal Sol (2.5 mg/3 ml) UD]  


 


Albuterol Sulfate [Proair Hfa] 2 puff NEB PRN PRN 07/14/17


 


Carvedilol [Coreg] 12.5 mg PO BID 07/14/17


 


Cyclobenzaprine [Flexeril] 10 mg PO DAILY 07/14/17


 


Fluticasone/Salmeterol 100/50 2 puff NEB DAILY 07/14/17





[Advair Diskus 100/50]  


 


Furosemide [Lasix] 40 mg PO DAILY 07/14/17


 


Omeprazole 40 mg PO DAILY 07/14/17


 


Potassium Chloride [K-Dur 20 mEq 20 meq PO DAILY 07/14/17





ER Tab]  


 


Ramipril [Altace] 10 mg PO DAILY 07/14/17


 


oxyCODONE [oxyCODONE Immediate 10 mg PO PRN PRN 07/14/17





Release Tab]  


 


Albuterol 0.083% [Albuterol 3 ml IH Q4 PRN #50 neb 06/02/18





Sulfate 3 Ml]  


 


Azithromycin [Zithromax] 250 mg PO DAILY #6 dose 06/02/18


 


Prednisone 50 mg PO DAILY #4 tablet 06/02/18














- Allergies


Allergies/Adverse Reactions: 


 Allergies











Allergy/AdvReac Type Severity Reaction Status Date / Time


 


No Known Allergies Allergy   Verified 05/17/16 22:32














Review of Systems


ROS Statement: Except As Marked, All Systems Reviewed And Found Negative (As 

per HPI, otherwise negative)


Constitutional: Negative for: Fever


ENT: Positive for: Throat Pain.  Negative for: Nose Discharge


Respiratory: Positive for: Shortness of Breath, Sputum (green)


Musculoskeletal: Negative for: Other (leg swelling)





Physical Exam





- Reviewed


Nursing Documentation Reviewed: Yes


Vital Signs Reviewed: Yes





- Physical Exam


Appears: Positive for: Non-toxic, No Acute Distress


Head Exam: Positive for: ATRAUMATIC, NORMOCEPHALIC


Skin: Positive for: Warm, Dry


Eye Exam: Positive for: EOMI, PERRL


ENT: Negative for: Pharyngeal Erythema, Tonsillar Exudate


Neck: Positive for: Painless ROM, Supple


Cardiovascular/Chest: Positive for: Regular Rate, Rhythm.  Negative for: Murmur


Respiratory: Positive for: Wheezing (diffused, scattered), Respiratory Distress 

(mild).  Negative for: Rales


Gastrointestinal/Abdominal: Positive for: Soft.  Negative for: Tenderness


Back: Positive for: Normal Inspection.  Negative for: Muscle Spasm


Extremity: Positive for: Normal ROM, Pedal Edema (trace of bilateral).  

Negative for: Deformity


Lymphatic: Negative for: Adenopathy


Neurologic/Psych: Positive for: Alert, Oriented (x3).  Negative for: Motor/

Sensory Deficits





- Laboratory Results


Result Diagrams: 


 06/02/18 20:00





 06/02/18 20:00





- ECG


O2 Sat by Pulse Oximetry: 97 (RA)


Pulse Ox Interpretation: Normal





Medical Decision Making


Medical Decision Making: 


Time: 1945


Initial Impression: shortness of breath on exertion 


Differential Diagnosis includes but is not limited to: CHF, acute coronary 

syndrome, bronchitis, flu, pneumonia 


Initial Plan:


--EKG


--B-type Natriuretic peptide 


--CMP


--Magnesium 


--Phosphorous 


--Troponin 


--ED Urine Pregnancy 


--ED Urine dipstick 


--CBC w/ Differential 


--PTT


--Prothrombin Time 


--Chest Two Views 


--Albuterol 9ml


--SOLU-Medrol 125mg


--Blood culture 


--peak flow pre/post TX


--Influenza A B 


--Reevaluation 





Time: 2135


labs demonstrate no clinical significant abnormalities and X-ray presents no 

acute disease. Patient feels better and is eager to be discharged. 





Clinical Impression: chronic obstructive pulmonary disease with acute lower 

respiratory infxn





Upon provider evaluation patient is medically stable, and requires no further 

treatment in the ED at this time. Patient will be discharged with Albuterol 

sulfate 3 Ml for asthma, Zithromax 250mg and Prednisone 50 mg for acute 

bronchitis and COPD. Counseling was provided and all questions were answered 

regarding diagnosis and need for follow up with PMD. There is agreement to 

discharge plan. Return if symptoms persist or worsen.


--------------------------------------------------------------------------------

-----------------


Scribe Attestation:   


Documented by Courtney Barber, acting as a scribe for Melody Locke MD





Provider Scribe Attestation:


All medical record entries made by the Scribe were at my direction and 

personally dictated by me. I have reviewed the chart and agree that the record 

accurately reflects my personal performance of the history, physical exam, 

medical decision making, and the department course for this patient. I have 

also personally directed, reviewed, and agree with the discharge instructions 

and disposition.











Disposition





- Clinical Impression


Clinical Impression: 


 Chr obstructive pulmonary disease w/ acute lower respiratory infxn








- Patient ED Disposition


Is Patient to be Admitted: No


Counseled Patient/Family Regarding: Studies Performed, Diagnosis, Need For 

Followup, Rx Given





- Disposition


Referrals: 


Feliciano Hope MD [Staff Provider] - 06/05/18


Disposition: Routine/Home


Disposition Time: 21:30


Condition: IMPROVED


Prescriptions: 


Albuterol 0.083% [Albuterol Sulfate 3 Ml] 3 ml IH Q4 PRN #50 neb


 PRN Reason: asthma


Azithromycin [Zithromax] 250 mg PO DAILY #6 dose


Prednisone 50 mg PO DAILY #4 tablet


Instructions:  Acute Bronchitis, Exacerbation of COPD (DC)


Forms:  [facility] (English)

## 2018-06-03 NOTE — RAD
HISTORY:

sob  



COMPARISON:

COMPARISON IS MADE WITH 07/14/2017



TECHNIQUE:

Chest PA and lateral



FINDINGS:



LUNGS:

No active pulmonary disease.



PLEURA:

No significant pleural effusion identified. No pneumothorax apparent.



CARDIOVASCULAR:

The heart is normal in size.  Left-sided pacemaker is again seen in 

place.



OSSEOUS STRUCTURES:

No significant abnormalities.



VISUALIZED UPPER ABDOMEN:

Normal.



OTHER FINDINGS:

None.



IMPRESSION:

No active disease.

## 2018-06-04 NOTE — CARD
--------------- APPROVED REPORT --------------





EKG Measurement

Heart [id number]

VA [id number]

[id number]

[id number]

[id number]



<Conclusion>

Sinus tachycardia with one premature ventricular complexes

Biatrial enlargement

T wave abnormality, consider lateral ischemia

Abnormal ECG

## 2018-10-03 ENCOUNTER — HOSPITAL ENCOUNTER (EMERGENCY)
Dept: HOSPITAL 14 - H.ER | Age: 53
Discharge: HOME | End: 2018-10-03
Payer: MEDICARE

## 2018-10-03 VITALS — OXYGEN SATURATION: 100 %

## 2018-10-03 VITALS
TEMPERATURE: 97.7 F | HEART RATE: 78 BPM | RESPIRATION RATE: 17 BRPM | DIASTOLIC BLOOD PRESSURE: 78 MMHG | SYSTOLIC BLOOD PRESSURE: 163 MMHG

## 2018-10-03 VITALS — BODY MASS INDEX: 35 KG/M2

## 2018-10-03 DIAGNOSIS — I11.0: ICD-10-CM

## 2018-10-03 DIAGNOSIS — R06.00: Primary | ICD-10-CM

## 2018-10-03 DIAGNOSIS — J44.9: ICD-10-CM

## 2018-10-03 DIAGNOSIS — Z95.0: ICD-10-CM

## 2018-10-03 LAB
ALBUMIN SERPL-MCNC: 4 G/DL (ref 3.5–5)
ALBUMIN/GLOB SERPL: 1.1 {RATIO} (ref 1–2.1)
ALT SERPL-CCNC: 25 U/L (ref 9–52)
AST SERPL-CCNC: 29 U/L (ref 14–36)
BASE EXCESS BLDV CALC-SCNC: 0.3 MMOL/L (ref 0–2)
BASOPHILS # BLD AUTO: 0 K/UL (ref 0–0.2)
BASOPHILS NFR BLD: 0.5 % (ref 0–2)
BNP SERPL-MCNC: 747 PG/ML (ref 0–900)
BUN SERPL-MCNC: 11 MG/DL (ref 7–17)
CALCIUM SERPL-MCNC: 9.9 MG/DL (ref 8.4–10.2)
EOSINOPHIL # BLD AUTO: 0.3 K/UL (ref 0–0.7)
EOSINOPHIL NFR BLD: 4.8 % (ref 0–4)
ERYTHROCYTE [DISTWIDTH] IN BLOOD BY AUTOMATED COUNT: 14.2 % (ref 11.5–14.5)
GFR NON-AFRICAN AMERICAN: > 60
HGB BLD-MCNC: 12.6 G/DL (ref 12–16)
LYMPHOCYTES # BLD AUTO: 2.8 K/UL (ref 1–4.3)
LYMPHOCYTES NFR BLD AUTO: 41.7 % (ref 20–40)
MCH RBC QN AUTO: 29.9 PG (ref 27–31)
MCHC RBC AUTO-ENTMCNC: 33 G/DL (ref 33–37)
MCV RBC AUTO: 90.4 FL (ref 81–99)
MONOCYTES # BLD: 0.8 K/UL (ref 0–0.8)
MONOCYTES NFR BLD: 12 % (ref 0–10)
NEUTROPHILS # BLD: 2.7 K/UL (ref 1.8–7)
NEUTROPHILS NFR BLD AUTO: 41 % (ref 50–75)
NRBC BLD AUTO-RTO: 0.1 % (ref 0–0)
PCO2 BLDV: 48 MMHG (ref 40–60)
PH BLDV: 7.35 [PH] (ref 7.32–7.43)
PLATELET # BLD: 174 K/UL (ref 130–400)
PMV BLD AUTO: 10.1 FL (ref 7.2–11.7)
RBC # BLD AUTO: 4.23 MIL/UL (ref 3.8–5.2)
VENOUS BLOOD FIO2: 21 %
VENOUS BLOOD GAS PO2: 59 MM/HG (ref 30–55)
WBC # BLD AUTO: 6.7 K/UL (ref 4.8–10.8)

## 2018-10-03 PROCEDURE — 84484 ASSAY OF TROPONIN QUANT: CPT

## 2018-10-03 PROCEDURE — 82803 BLOOD GASES ANY COMBINATION: CPT

## 2018-10-03 PROCEDURE — 94640 AIRWAY INHALATION TREATMENT: CPT

## 2018-10-03 PROCEDURE — 83880 ASSAY OF NATRIURETIC PEPTIDE: CPT

## 2018-10-03 PROCEDURE — 99285 EMERGENCY DEPT VISIT HI MDM: CPT

## 2018-10-03 PROCEDURE — 80053 COMPREHEN METABOLIC PANEL: CPT

## 2018-10-03 PROCEDURE — 85025 COMPLETE CBC W/AUTO DIFF WBC: CPT

## 2018-10-03 PROCEDURE — 71046 X-RAY EXAM CHEST 2 VIEWS: CPT

## 2018-10-03 PROCEDURE — 96374 THER/PROPH/DIAG INJ IV PUSH: CPT

## 2018-10-03 PROCEDURE — 94150 VITAL CAPACITY TEST: CPT

## 2018-10-03 NOTE — RAD
Date of service: 



10/03/2018



HISTORY:

 hx of sarcoidosis, asthma, chf, pacemaker- sob 



COMPARISON:

6/2/2018



TECHNIQUE:

Chest PA and lateral



FINDINGS:



LUNGS:

No consolidation appreciated.  Interstitial and bronchovascular 

markings slightly prominent yet similar in appearance history of 

sarcoidosis.  No interval change in this appearance noted.



PLEURA:

No significant pleural effusion identified. No pneumothorax apparent.



CARDIOVASCULAR:

Probable top-normal heart size.  Pacemaker AICD device appears 

grossly intact and satisfactory and positioning. 



OSSEOUS STRUCTURES:

No significant abnormalities.



VISUALIZED UPPER ABDOMEN:

Normal.



OTHER FINDINGS:

None.



IMPRESSION:

No interval pathology noted. 



Other findings as above.

## 2018-10-03 NOTE — ED PDOC
- Laboratory Results


Result Diagrams: 


                                 10/03/18 06:51





                                 10/03/18 07:34





- ECG


O2 Sat by Pulse Oximetry: 100 (RA)


Pulse Ox Interpretation: Normal





Medical Decision Making


Medical Decision Making: 








Time: 07:00


--Patient care endorsed from Dr. Peoples to Dr. Nash pending labs, reevaluation 

and discharge if normal. Patient's PMD is Dr. Harrell.





Time: 08:23


FINDINGS:





LUNGS:


No consolidation appreciated.  Interstitial and bronchovascular markings 

slightly prominent yet similar in appearance history of sarcoidosis.  No 

interval change in this appearance noted.





PLEURA:


No significant pleural effusion identified. No pneumothorax apparent.





CARDIOVASCULAR:


Probable top-normal heart size.  Pacemaker AICD device appears grossly intact 

and satisfactory and positioning. 





OSSEOUS STRUCTURES:


No significant abnormalities.





VISUALIZED UPPER ABDOMEN:


Normal.





OTHER FINDINGS:


None.





IMPRESSION:


No interval pathology noted. 





Other findings as above.





Time: 09:29


--Patient feels better at this time. She still has a mild wheeze.


--Will reheck troponin levels, patient feels improved but still not 100 percent.


--Order nebulizer treatment


--Patient is speaking full sentences, comfortable, awake and is in no acute 

respiratory distress.





Time: 12:03


--Repeat troponin is found negative











---------------------------------------------------

----------------------------------------------


Scribe Attestation:


Documented by Andre Calderon acting as a scribe for Dyan Nash MD





Provider Scribe Attestation:


All medical record entries made by the Scribe were at my direction and 

personally dictated by me. I have reviewed the chart and agree that the record 

accurately reflects my personal performance of the history, physical exam, 

medical decision making, and the department course for this patient. I have also

personally directed, reviewed, and agree with the discharge instructions and 

disposition.





Disposition





- Clinical Impression


Clinical Impression: 


 Dyspnea








- Disposition


Condition: IMPROVED


Additional Instructions: 


follow up with Dr harrell in 1-2 days 


return to the ED with any worsening or concerning symptoms


Prescriptions: 


Albuterol HFA [Ventolin HFA 90 mcg/actuation (8 g)] 1 - 2 puff IH Q4H PRN #1 

bottle


 PRN Reason: Wheezing


Instructions:  Asthma, Adult (DC), Shortness of Breath (Dyspnea) (DC)


Forms:  [employer] [facility] (English)

## 2018-10-03 NOTE — ED PDOC
HPI: SOB/CHF/COPD


Time Seen by Provider: 10/03/18 05:40


Chief Complaint (Nursing): Shortness Of Breath


Chief Complaint (Provider): Shortness Of Breath


History Per: Patient


History/Exam Limitations: no limitations


Onset/Duration Of Symptoms: Days


Current Symptoms Are (Timing): Still Present


Additional Complaint(s): 


Darinel Franz is a 53 year old female with a past medical history of 

sarcoidosis, asthma, and CHF s/p pacemaker who is presenting to the ED for 

evaluation of shortness of breath. Patient states that she finished her 10 day 

course of prednisone last Friday (5 days ago) as well as antibiotics prescribed 

by Dr. Hope for cough and chest tightness. Since Saturday, patient reports 

worsening of the same symptoms and complains of chest tightness, shortness of 

breath, and productive cough with white phlegm. She denies any fevers, or leg 

swelling. Patient notes some decreased exercise tolerance at the gym but can 

walk her normal amount with ease. 





PMD: Feliciano Hope











Past Medical History


Reviewed: Historical Data, Nursing Documentation, Vital Signs


Vital Signs: 





                                Last Vital Signs











Temp  98.1 F   10/03/18 05:34


 


Pulse  66   10/03/18 05:34


 


Resp  14   10/03/18 06:00


 


BP  157/98 H  10/03/18 05:34


 


Pulse Ox  100   10/03/18 06:00














- Medical History


PMH: Anxiety, Asthma, CAD, Cardia Arrhythmia, CHF, COPD, Depression, HTN, 

Pneumonia


   Denies: Arthritis, Atrial Fibrillation, Diabetes, HIV, Hypercholesterolemia, 

Hypothyroidism, Mitral Valve Prolapse, Peripheral Edema, Chronic Kidney Disease,

Rheumatoid Arthritis, Seizures, TIA





- Surgical History


Surgical History: Back Surgery (lumbar)


   Denies: Pacemaker





- Family History


Family History: States: Unknown Family Hx





- Social History


Ex-Smoker (has not smoked in the last 12 months): Yes


Alcohol: None


Drugs: Denies





- Immunization History


Hx Tetanus Toxoid Vaccination: No


Hx Influenza Vaccination: No


Hx Pneumococcal Vaccination: No





- Home Medications


Home Medications: 


                                Ambulatory Orders











 Medication  Instructions  Recorded


 


Albuterol 0.083% [Albuterol 0.083% 2.5 mg NEB BID 07/14/17





Inhal Sol (2.5 mg/3 ml) UD]  


 


Albuterol Sulfate [Proair Hfa] 2 puff NEB PRN PRN 07/14/17


 


Carvedilol [Coreg] 12.5 mg PO BID 07/14/17


 


Cyclobenzaprine [Flexeril] 10 mg PO DAILY 07/14/17


 


Fluticasone/Salmeterol 100/50 2 puff NEB DAILY 07/14/17





[Advair Diskus 100/50]  


 


Furosemide [Lasix] 40 mg PO DAILY 07/14/17


 


Omeprazole 40 mg PO DAILY 07/14/17


 


Potassium Chloride [K-Dur 20 mEq 20 meq PO DAILY 07/14/17





ER Tab]  


 


Ramipril [Altace] 10 mg PO DAILY 07/14/17


 


oxyCODONE [oxyCODONE Immediate 10 mg PO PRN PRN 07/14/17





Release Tab]  


 


Albuterol 0.083% [Albuterol 3 ml IH Q4 PRN #50 neb 06/02/18





Sulfate 3 Ml]  


 


Azithromycin [Zithromax] 250 mg PO DAILY #6 dose 06/02/18


 


Prednisone 50 mg PO DAILY #4 tablet 06/02/18














- Allergies


Allergies/Adverse Reactions: 


                                    Allergies











Allergy/AdvReac Type Severity Reaction Status Date / Time


 


No Known Allergies Allergy   Verified 05/17/16 22:32














Review of Systems


ROS Statement: Except As Marked, All Systems Reviewed And Found Negative


Constitutional: Negative for: Fever


Cardiovascular: Positive for: Chest Pain (tightness)


Respiratory: Positive for: Cough, Shortness of Breath


Musculoskeletal: Negative for: Other (leg swelling)





Physical Exam





- Reviewed


Nursing Documentation Reviewed: Yes


Vital Signs Reviewed: Yes





- Physical Exam


Appears: Positive for: Non-toxic, No Acute Distress


Head Exam: Positive for: ATRAUMATIC, NORMAL INSPECTION, NORMOCEPHALIC


Skin: Positive for: Normal Color, Warm, DRY


Eye Exam: Positive for: EOMI, Normal appearance, PERRL


ENT: Positive for: Normal ENT Inspection


Neck: Positive for: Normal, Painless ROM


Cardiovascular/Chest: Positive for: Regular Rate, Rhythm.  Negative for: Murmur


Respiratory: Positive for: Wheezing (bilateral), Other (speaking in full 

sentences)


Gastrointestinal/Abdominal: Positive for: Normal Exam, Soft.  Negative for: 

Tenderness


Back: Positive for: Normal Inspection.  Negative for: L CVA Tenderness, R CVA 

Tenderness


Extremity: Positive for: Normal ROM.  Negative for: Deformity, Swelling


Neurologic/Psych: Positive for: Alert, Oriented.  Negative for: Motor/Sensory 

Deficits





- ECG


O2 Sat by Pulse Oximetry: 100 (RA)


Pulse Ox Interpretation: Normal





Medical Decision Making


Medical Decision Making: 


Time: 6:26


A/P: 53 year old female with a history of sarcoidosis, asthma, and CHF s/p 

pacemaker, presenting with shortness of breath and chest tightness


--Well appearing patient with stable vitals


--Differentials include: Asthma Exacerbation, CHF, URI, Bronchitis, Pneumonia 





Orders: 


--BNP


--BMP


--Troponin


--CBC


--Duoneb 3 ml INH


--SOLU-Medrol 125 mg IVP


--Peak Flow Pre/Post Tx





7:00





Patient will be signed out to Dr. Nash pending labs and reevaluation.





 

--------------------------------------------------------------------------------


-----------------


Scribe Attestation:   


Documented by Aurora Souza, acting as a scribe for John Peoples MD.





Provider Scribe Attestation:


All medical record entries made by the Scribe were at my direction and 

personally dictated by me. I have reviewed the chart and agree that the record 

accurately reflects my personal performance of the history, physical exam, 

medical decision making, and the department course for this patient. I have also

 personally directed, reviewed, and agree with the discharge instructions and 

disposition.








Disposition





- Clinical Impression


Clinical Impression: 


 Dyspnea








- Patient ED Disposition


Is Patient to be Admitted: Transfer of Care





- Disposition


Disposition: Transfer of Care


Disposition Time: 07:00


Condition: STABLE


Forms:  Tucker Blair (English)


Patient Signed Over To: Dyan Nash


Handoff Comments: pending workup and re-eval